# Patient Record
Sex: FEMALE | Race: WHITE | NOT HISPANIC OR LATINO | Employment: FULL TIME | URBAN - METROPOLITAN AREA
[De-identification: names, ages, dates, MRNs, and addresses within clinical notes are randomized per-mention and may not be internally consistent; named-entity substitution may affect disease eponyms.]

---

## 2019-02-21 ENCOUNTER — OFFICE VISIT - HEALTHEAST (OUTPATIENT)
Dept: INTERNAL MEDICINE | Facility: CLINIC | Age: 59
End: 2019-02-21

## 2019-02-21 DIAGNOSIS — Z12.4 SCREENING FOR MALIGNANT NEOPLASM OF CERVIX: ICD-10-CM

## 2019-02-21 DIAGNOSIS — Z12.11 SPECIAL SCREENING FOR MALIGNANT NEOPLASMS, COLON: ICD-10-CM

## 2019-02-21 DIAGNOSIS — Z12.31 VISIT FOR SCREENING MAMMOGRAM: ICD-10-CM

## 2019-02-21 DIAGNOSIS — Z13.220 LIPID SCREENING: ICD-10-CM

## 2019-02-21 DIAGNOSIS — Z01.818 PREOPERATIVE EXAMINATION: ICD-10-CM

## 2019-02-21 DIAGNOSIS — Z13.29 SCREENING FOR THYROID DISORDER: ICD-10-CM

## 2019-02-21 DIAGNOSIS — Z78.0 POSTMENOPAUSAL STATUS: ICD-10-CM

## 2019-02-21 DIAGNOSIS — M25.512 ACUTE PAIN OF LEFT SHOULDER: ICD-10-CM

## 2019-02-21 DIAGNOSIS — Z00.00 ANNUAL PHYSICAL EXAM: ICD-10-CM

## 2019-02-21 LAB
ALBUMIN SERPL-MCNC: 4.5 G/DL (ref 3.5–5)
ALP SERPL-CCNC: 81 U/L (ref 45–120)
ALT SERPL W P-5'-P-CCNC: 22 U/L (ref 0–45)
ANION GAP SERPL CALCULATED.3IONS-SCNC: 11 MMOL/L (ref 5–18)
AST SERPL W P-5'-P-CCNC: 16 U/L (ref 0–40)
ATRIAL RATE - MUSE: 72 BPM
BASOPHILS # BLD AUTO: 0 THOU/UL (ref 0–0.2)
BASOPHILS NFR BLD AUTO: 0 % (ref 0–2)
BILIRUB SERPL-MCNC: 0.7 MG/DL (ref 0–1)
BUN SERPL-MCNC: 13 MG/DL (ref 8–22)
CALCIUM SERPL-MCNC: 9.8 MG/DL (ref 8.5–10.5)
CHLORIDE BLD-SCNC: 105 MMOL/L (ref 98–107)
CHOLEST SERPL-MCNC: 273 MG/DL
CO2 SERPL-SCNC: 26 MMOL/L (ref 22–31)
CREAT SERPL-MCNC: 0.71 MG/DL (ref 0.6–1.1)
DIASTOLIC BLOOD PRESSURE - MUSE: NORMAL MMHG
EOSINOPHIL # BLD AUTO: 0.1 THOU/UL (ref 0–0.4)
EOSINOPHIL NFR BLD AUTO: 2 % (ref 0–6)
ERYTHROCYTE [DISTWIDTH] IN BLOOD BY AUTOMATED COUNT: 12.8 % (ref 11–14.5)
FASTING STATUS PATIENT QL REPORTED: ABNORMAL
GFR SERPL CREATININE-BSD FRML MDRD: >60 ML/MIN/1.73M2
GLUCOSE BLD-MCNC: 84 MG/DL (ref 70–125)
HCT VFR BLD AUTO: 45.3 % (ref 35–47)
HDLC SERPL-MCNC: 80 MG/DL
HGB BLD-MCNC: 14.6 G/DL (ref 12–16)
INTERPRETATION ECG - MUSE: NORMAL
LDLC SERPL CALC-MCNC: 176 MG/DL
LYMPHOCYTES # BLD AUTO: 1.3 THOU/UL (ref 0.8–4.4)
LYMPHOCYTES NFR BLD AUTO: 29 % (ref 20–40)
MCH RBC QN AUTO: 29.7 PG (ref 27–34)
MCHC RBC AUTO-ENTMCNC: 32.2 G/DL (ref 32–36)
MCV RBC AUTO: 92 FL (ref 80–100)
MONOCYTES # BLD AUTO: 0.4 THOU/UL (ref 0–0.9)
MONOCYTES NFR BLD AUTO: 10 % (ref 2–10)
NEUTROPHILS # BLD AUTO: 2.7 THOU/UL (ref 2–7.7)
NEUTROPHILS NFR BLD AUTO: 59 % (ref 50–70)
P AXIS - MUSE: 30 DEGREES
PLATELET # BLD AUTO: 265 THOU/UL (ref 140–440)
PMV BLD AUTO: 10.5 FL (ref 8.5–12.5)
POTASSIUM BLD-SCNC: 4.1 MMOL/L (ref 3.5–5)
PR INTERVAL - MUSE: 110 MS
PROT SERPL-MCNC: 7.3 G/DL (ref 6–8)
QRS DURATION - MUSE: 90 MS
QT - MUSE: 406 MS
QTC - MUSE: 444 MS
R AXIS - MUSE: 39 DEGREES
RBC # BLD AUTO: 4.91 MILL/UL (ref 3.8–5.4)
SODIUM SERPL-SCNC: 142 MMOL/L (ref 136–145)
SYSTOLIC BLOOD PRESSURE - MUSE: NORMAL MMHG
T AXIS - MUSE: 54 DEGREES
TRIGL SERPL-MCNC: 86 MG/DL
TSH SERPL DL<=0.005 MIU/L-ACNC: 1.73 UIU/ML (ref 0.3–5)
VENTRICULAR RATE- MUSE: 72 BPM
WBC: 4.6 THOU/UL (ref 4–11)

## 2019-02-21 ASSESSMENT — MIFFLIN-ST. JEOR: SCORE: 1363.33

## 2019-02-22 ENCOUNTER — RECORDS - HEALTHEAST (OUTPATIENT)
Dept: ADMINISTRATIVE | Facility: OTHER | Age: 59
End: 2019-02-22

## 2019-02-22 ENCOUNTER — COMMUNICATION - HEALTHEAST (OUTPATIENT)
Dept: INTERNAL MEDICINE | Facility: CLINIC | Age: 59
End: 2019-02-22

## 2019-02-25 LAB
BKR LAB AP ABNORMAL BLEEDING: NO
BKR LAB AP BIRTH CONTROL/HORMONES: NORMAL
BKR LAB AP CERVICAL APPEARANCE: NORMAL
BKR LAB AP GYN ADEQUACY: NORMAL
BKR LAB AP GYN INTERPRETATION: NORMAL
BKR LAB AP HPV REFLEX: NORMAL
BKR LAB AP LMP: 2017
BKR LAB AP PATIENT STATUS: NORMAL
BKR LAB AP PREVIOUS ABNORMAL: NO
BKR LAB AP PREVIOUS NORMAL: 2017
HIGH RISK?: NO
PATH REPORT.COMMENTS IMP SPEC: NORMAL
RESULT FLAG (HE HISTORICAL CONVERSION): NORMAL

## 2019-03-05 ENCOUNTER — RECORDS - HEALTHEAST (OUTPATIENT)
Dept: INTERNAL MEDICINE | Facility: CLINIC | Age: 59
End: 2019-03-05

## 2019-03-05 ENCOUNTER — COMMUNICATION - HEALTHEAST (OUTPATIENT)
Dept: INTERNAL MEDICINE | Facility: CLINIC | Age: 59
End: 2019-03-05

## 2019-04-04 ENCOUNTER — COMMUNICATION - HEALTHEAST (OUTPATIENT)
Dept: INTERNAL MEDICINE | Facility: CLINIC | Age: 59
End: 2019-04-04

## 2019-04-09 ENCOUNTER — RECORDS - HEALTHEAST (OUTPATIENT)
Dept: ADMINISTRATIVE | Facility: OTHER | Age: 59
End: 2019-04-09

## 2019-05-08 ENCOUNTER — COMMUNICATION - HEALTHEAST (OUTPATIENT)
Dept: INTERNAL MEDICINE | Facility: CLINIC | Age: 59
End: 2019-05-08

## 2021-06-02 VITALS — BODY MASS INDEX: 27.16 KG/M2 | HEIGHT: 66 IN | WEIGHT: 169 LBS

## 2021-06-18 NOTE — LETTER
Letter by Colette Cristina CNP at      Author: Colette Cristina CNP Service: -- Author Type: --    Filed:  Encounter Date: 3/5/2019 Status: (Other)       Santa Clarke  [address] [facility] [address]             March 5, 2019         Dear Ms. Clarke,    Below are the results from your recent visit:    Resulted Orders   Gynecologic Cytology (PAP Smear)   Result Value Ref Range    Case Report       Gynecologic Cytology Report                       Case: [id number]                                   Authorizing Provider:  Colette Cristina,    Collected:           02/21/2019 1156                                     CNP                                                                          Ordering Location:     [facility]         Received:            02/21/2019 1156                                     Medicine                                                                     First Screen:          Ludwig Valencia, CT                                                                           (ASCP)                                                                       Specimen:    SUREPATH PAP, SCREENING, Endocervical/cervical                                             Interpretation  Negative for squamous intraepithelial lesion or malignancy.      Negative for squamous intraepithelial lesion or malignancy    Result Flag Normal Normal    Specimen Adequacy       Satisfactory for evaluation, endocervical/transformation zone component present    HPV Reflex? Yes if ASCUS     HIGH RISK No     LMP/Menopause Date 2017     Abnormal Bleeding No     Pt Status n/a     Birth Control/Hormones None     Previous Normal/Date 2017     Prev Abn Date/Dx no     Cervical Appearance normal    Comprehensive Metabolic Panel   Result Value Ref Range    Sodium 142 136 - 145 mmol/L    Potassium 4.1 3.5 - 5.0 mmol/L    Chloride 105 98 - 107 mmol/L    CO2 26 22 - 31 mmol/L    Anion Gap, Calculation 11  5 - 18 mmol/L    Glucose 84 70 - 125 mg/dL    BUN 13 8 - 22 mg/dL    Creatinine 0.71 0.60 - 1.10 mg/dL    GFR MDRD Af Amer >60 >60 mL/min/1.73m2    GFR MDRD Non Af Amer >60 >60 mL/min/1.73m2    Bilirubin, Total 0.7 0.0 - 1.0 mg/dL    Calcium 9.8 8.5 - 10.5 mg/dL    Protein, Total 7.3 6.0 - 8.0 g/dL    Albumin 4.5 3.5 - 5.0 g/dL    Alkaline Phosphatase 81 45 - 120 U/L    AST 16 0 - 40 U/L    ALT 22 0 - 45 U/L    Narrative    Fasting Glucose reference range is 70-99 mg/dL per  American Diabetes Association (ADA) guidelines.   Lipid [place] RANDOM   Result Value Ref Range    Cholesterol 273 (H) <=199 mg/dL    Triglycerides 86 <=149 mg/dL    HDL Cholesterol 80 >=50 mg/dL    LDL Calculated 176 (H) <=129 mg/dL    Patient Fasting > 8hrs? Unknown    Thyroid Stimulating Hormone (TSH)   Result Value Ref Range    TSH 1.73 0.30 - 5.00 uIU/mL   HM1 (CBC with Diff)   Result Value Ref Range    WBC 4.6 4.0 - 11.0 thou/uL    RBC 4.91 3.80 - 5.40 mill/uL    Hemoglobin 14.6 12.0 - 16.0 g/dL    Hematocrit 45.3 35.0 - 47.0 %    MCV 92 80 - 100 fL    MCH 29.7 27.0 - 34.0 pg    MCHC 32.2 32.0 - 36.0 g/dL    RDW 12.8 11.0 - 14.5 %    Platelets 265 140 - 440 thou/uL    MPV 10.5 8.5 - 12.5 fL    Neutrophils % 59 50 - 70 %    Lymphocytes % 29 20 - 40 %    Monocytes % 10 2 - 10 %    Eosinophils % 2 0 - 6 %    Basophils % 0 0 - 2 %    Neutrophils Absolute 2.7 2.0 - 7.7 thou/uL    Lymphocytes Absolute 1.3 0.8 - 4.4 thou/uL    Monocytes Absolute 0.4 0.0 - 0.9 thou/uL    Eosinophils Absolute 0.1 0.0 - 0.4 thou/uL    Basophils Absolute 0.0 0.0 - 0.2 thou/uL        Please see your recent pap results which are negative. Recommend continued routine screening every  5 years.     Please call with questions or contact us using [employer].    Sincerely,        Electronically signed by Colette Cristina CNP

## 2021-06-18 NOTE — LETTER
Letter by Colette Cristina CNP at      Author: Colette Cristina CNP Service: -- Author Type: --    Filed:  Encounter Date: 2/22/2019 Status: (Other)       Parent/guardian of Santa Clarke  [facility] [address]             February 22, 2019         Dear Santa Clarke,    Below are the results from Santa's recent visit:    Resulted Orders   Comprehensive Metabolic Panel   Result Value Ref Range    Sodium 142 136 - 145 mmol/L    Potassium 4.1 3.5 - 5.0 mmol/L    Chloride 105 98 - 107 mmol/L    CO2 26 22 - 31 mmol/L    Anion Gap, Calculation 11 5 - 18 mmol/L    Glucose 84 70 - 125 mg/dL    BUN 13 8 - 22 mg/dL    Creatinine 0.71 0.60 - 1.10 mg/dL    GFR MDRD Af Amer >60 >60 mL/min/1.73m2    GFR MDRD Non Af Amer >60 >60 mL/min/1.73m2    Bilirubin, Total 0.7 0.0 - 1.0 mg/dL    Calcium 9.8 8.5 - 10.5 mg/dL    Protein, Total 7.3 6.0 - 8.0 g/dL    Albumin 4.5 3.5 - 5.0 g/dL    Alkaline Phosphatase 81 45 - 120 U/L    AST 16 0 - 40 U/L    ALT 22 0 - 45 U/L    Narrative    Fasting Glucose reference range is 70-99 mg/dL per  American Diabetes Association (ADA) guidelines.   Lipid [place] RANDOM   Result Value Ref Range    Cholesterol 273 (H) <=199 mg/dL    Triglycerides 86 <=149 mg/dL    HDL Cholesterol 80 >=50 mg/dL    LDL Calculated 176 (H) <=129 mg/dL    Patient Fasting > 8hrs? Unknown    Thyroid Stimulating Hormone (TSH)   Result Value Ref Range    TSH 1.73 0.30 - 5.00 uIU/mL   HM1 (CBC with Diff)   Result Value Ref Range    WBC 4.6 4.0 - 11.0 thou/uL    RBC 4.91 3.80 - 5.40 mill/uL    Hemoglobin 14.6 12.0 - 16.0 g/dL    Hematocrit 45.3 35.0 - 47.0 %    MCV 92 80 - 100 fL    MCH 29.7 27.0 - 34.0 pg    MCHC 32.2 32.0 - 36.0 g/dL    RDW 12.8 11.0 - 14.5 %    Platelets 265 140 - 440 thou/uL    MPV 10.5 8.5 - 12.5 fL    Neutrophils % 59 50 - 70 %    Lymphocytes % 29 20 - 40 %    Monocytes % 10 2 - 10 %    Eosinophils % 2 0 - 6 %    Basophils % 0 0 - 2 %    Neutrophils Absolute 2.7 2.0 - 7.7  thou/uL    Lymphocytes Absolute 1.3 0.8 - 4.4 thou/uL    Monocytes Absolute 0.4 0.0 - 0.9 thou/uL    Eosinophils Absolute 0.1 0.0 - 0.4 thou/uL    Basophils Absolute 0.0 0.0 - 0.2 thou/uL        Santa,  Please see your recent lab results.  Your cholesterol is mildly elevated.  I recommend following a  low cholesterol diet and regular exercise.  It was a pleasure meeting you.  Thank you for choosing  [facility] and for allowing me to be part of your healthcare team.     Please call with questions or contact us using [employer].    Sincerely,        Electronically signed by Colette Cristina, CNP

## 2021-06-19 NOTE — LETTER
Letter by Colette Cristina CNP at      Author: Colette Cristina CNP Service: -- Author Type: --    Filed:  Encounter Date: 5/8/2019 Status: (Other)               Santa Clarke  [address] [facility] [address]      [birth year]      Dear Ms. Clarke,      In reviewing your records, we have determined a gap in your preventative services.  Based on your age and health history, we recommend the following:      Mammogram      If you have had the service elsewhere, or have transferred your care to another clinic, please contact us so we can update our records.     Please call [phone] to schedule an appointment.    We believe that a strong preventative care program, including regular physicals and follow-up care is an important part of a healthy lifestyle and we are committed to helping you maintain your health.    Thank you for choosing us as your health care provider.    Sincerely,    [facility]

## 2021-06-19 NOTE — LETTER
Letter by Colette Cristina CNP at      Author: Colette Cristina CNP Service: -- Author Type: --    Filed:  Encounter Date: 4/4/2019 Status: (Other)               Santa Clarke  [address] [facility] [address]      [birth year]      Dear Santa Clarke,      In reviewing your records, we have determined a gap in your preventative services.  Based on your age and health history, we recommend the following:      Colon cancer screening    Mammogram      If you have had the service elsewhere, or have transferred your care to another clinic, please contact us so we can update our records.     Please call [phone] to schedule an appointment.    We believe that a strong preventative care program, including regular physicals and follow-up care is an important part of a healthy lifestyle and we are committed to helping you maintain your health.    Thank you for choosing us as your health care provider.    Sincerely,    [facility]

## 2021-06-24 NOTE — PROGRESS NOTES
Assessment/Plan:     1. Visit for screening mammogram  - Mammo Screening Bilateral; Future    2. Screening for malignant neoplasm of cervix  - Gynecologic Cytology (PAP Smear)    3. Acute pain of left shoulder  - Ambulatory referral to Orthopedics  Recommend utilization of over-the-counter pain reliever as needed  4. Special screening for malignant neoplasms, colon  Referral to colonoscopy has been completed  - Electrocardiogram Perform and Read  - Comprehensive Metabolic Panel  - HM1(CBC and Differential)  - HM1 (CBC with Diff)    5. Lipid screening  We will obtain a lipid panel for screening recommend 30 minutes of purposeful activity most days of the week low-cholesterol diet  - Lipid [place] RANDOM  - Ambulatory referral for Colonoscopy    6. Screening for thyroid disorder  - Thyroid Stimulating Hormone (TSH)    7. Preoperative examination  Colonoscopy referral completed    8. Annual physical exam  Follow-up one year      - Reviewed the importance of monitoring for changes in breast appearance such as nipple inversion, changes in breast tissue texture, non-healing wounds, or nipple discharge           Subjective:     Santa Clarke is a 58 y.o. female who presents for an annual exam.  Patient reports it has been nearly 2 years since her last.  She did undergo an ablation.  Though she reports having some symptoms of being postmenopausal including hot flashes, weight changes some sleeping difficulties.    Patient reports Left shoulder pain for the past 5 months.  She states that she has been doing some research on line and felt that the symptoms of frozen shoulder she subsequently has been doing at home exercises though has found no relief.  She reports some sleep disturbance secondary to the left shoulder pain she is requesting referral to orthopedics for possible injection.  Patient states she has tried over-the-counter pain reliever only when extremely needed does find mild relief not total relief of  symptoms.    Patient reports some more difficulty with controlling her bowel movements.  She has not undergone colonoscopy in the past.  She denies any family history of colon cancer or colon polyps.  She denies any constipation, diarrhea, blood in stool or black tarry stools.    Patient reports a hyperpigmented area on her right forearm she states it is been slightly larger without irritation.    The patient reports that there is not domestic violence in her life.     Healthy Habits:   Regular Exercise: Yes  Sunscreen Use: Yes  Healthy Diet: Yes  Dental Visits Regularly: Yes  Sexually active: Yes  Mammogram:   Colonoscopy: No  Prevention of Osteoporosis: Yes  Last Dexa: N/A    Immunization History   Administered Date(s) Administered     Tdap 12/07/2012     Immunization status: up to date and documented.    Gynecologic History  No LMP recorded (approximate).  Contraception: post menopausal status  Last Pap: 2017. Results were: normal HPV testing:   Last mammogram: 2017 Results were: normal    OB History   No data available        No current outpatient medications on file.     No current facility-administered medications for this visit.      No past medical history on file.  Past Surgical History:   Procedure Laterality Date     COMBINED AUGMENTATION MAMMAPLASTY AND ABDOMINOPLASTY  2011     SKIN GRAFT       Patient has no known allergies.  History reviewed. No pertinent family history.  Social History     Socioeconomic History     Marital status:      Spouse name: Not on file     Number of children: 2     Years of education: Not on file     Highest education level: Not on file   Occupational History     Occupation: realtor   Social Needs     Financial resource strain: Not on file     Food insecurity:     Worry: Not on file     Inability: Not on file     Transportation needs:     Medical: Not on file     Non-medical: Not on file   Tobacco Use     Smoking status: Never Smoker     Smokeless tobacco: Never Used  "  Substance and Sexual Activity     Alcohol use: Not on file     Drug use: Not on file     Sexual activity: Not on file   Lifestyle     Physical activity:     Days per week: Not on file     Minutes per session: Not on file     Stress: Not on file   Relationships     Social connections:     Talks on phone: Not on file     Gets together: Not on file     Attends Taoism service: Not on file     Active member of club or organization: Not on file     Attends meetings of clubs or organizations: Not on file     Relationship status: Not on file     Intimate partner violence:     Fear of current or ex partner: Not on file     Emotionally abused: Not on file     Physically abused: Not on file     Forced sexual activity: Not on file   Other Topics Concern     Not on file   Social History Narrative     Not on file       Review of Systems  General:  Negative except as noted above  Eyes: Negative except as noted above  Ears/Nose/Throat: Negative except as noted above  Cardiovascular: Negative except as noted above  Respiratory:  Negative except as noted above  Gastrointestinal:  Negative except as noted above  Musculoskeletal:  Negative except as noted above  Skin: Negative except as noted above  Neurologic: Negative except as noted above  Psychiatric: Negative except as noted above  Endocrine: Negative except as noted above  Heme/Lymphatic: Negative except as noted above   Allergic/Immunologic: Negative except as noted above      Objective:      Vitals:    02/21/19 1059   BP: 102/70   Pulse: 72   SpO2: 100%   Weight: 169 lb (76.7 kg)   Height: 5' 6\" (1.676 m)     Wt Readings from Last 3 Encounters:   02/21/19 169 lb (76.7 kg)     Body mass index is 27.28 kg/m . (>25?)    Physical Exam:  General Appearance: Alert, cooperative, no distress.  Head: Normocephalic, without obvious abnormality, atraumatic  Eyes: PERRL, conjunctiva/corneas clear, EOM's intact  Ears: Normal TM's and external ear canals, both ears  Nose: Nares normal, " septum midline,mucosa normal, no drainage  Throat: Lips, mucosa, and tongue normal  Neck: Supple, symmetrical, trachea midline, no adenopathy;  thyroid: not enlarged, symmetric, no tenderness/mass/nodules  Back: Symmetric, no curvature, ROM normal, no CVA tenderness  Lungs: Clear to auscultation bilaterally, respirations unlabored  Breasts: No breast masses, tenderness, asymmetry, or nipple discharge.  Implants noted  Heart: Regular rate and rhythm, S1 and S2 normal, no murmur, rub, or gallop  Abdomen: Soft, non-tender, bowel sounds active all four quadrants,  no masses, no organomegaly, patient noted to have a surgical scar post cosmetic surgery  Pelvic: Genital: EXTERNAL GENITALIA: Normal appearing vulva without masses, tenderness or lesions. PERINEUM: normal and intact. URETHRAL MEATUS: normal VAGINA:  vagina with normal color and without discharge or lesions. CERVIX: normal appearing cervix without discharge or lesions.  Pap obtained non-friable. No CMT. UTERUS: uterus is normal size, shape, consistency, and non-tender. ADNEXA: no tenderness or fullness.   Extremities: Extremities normal, atraumatic, no cyanosis or edema, she has right anterior shoulder pain increases with abduction and decreased range of motion is noted.  Skin: Skin color, texture, turgor normal, no rashes, she has a small hyperpigmented area on her right forearm she is advised to monitor if changes noted will recommend punch biopsy.  Lymph nodes: Cervical, supraclavicular, and axillary nodes normal  Neurologic: Normal  Psych: Normal affect.  Does not appear anxious or depressed.

## 2023-10-25 DIAGNOSIS — Q27.9 VENOUS MALFORMATION: Primary | ICD-10-CM

## 2023-11-01 ENCOUNTER — OFFICE VISIT (OUTPATIENT)
Dept: DERMATOLOGY | Facility: CLINIC | Age: 63
End: 2023-11-01
Attending: RADIOLOGY
Payer: COMMERCIAL

## 2023-11-01 ENCOUNTER — HOSPITAL ENCOUNTER (OUTPATIENT)
Dept: ULTRASOUND IMAGING | Facility: CLINIC | Age: 63
Discharge: HOME | End: 2023-11-01
Attending: RADIOLOGY
Payer: COMMERCIAL

## 2023-11-01 VITALS
HEART RATE: 71 BPM | SYSTOLIC BLOOD PRESSURE: 114 MMHG | HEIGHT: 65 IN | WEIGHT: 177.47 LBS | DIASTOLIC BLOOD PRESSURE: 74 MMHG | BODY MASS INDEX: 29.57 KG/M2

## 2023-11-01 DIAGNOSIS — Q27.9 VENOUS MALFORMATION: ICD-10-CM

## 2023-11-01 DIAGNOSIS — R22.41 MASS OF RIGHT FOOT: Primary | ICD-10-CM

## 2023-11-01 DIAGNOSIS — Q27.9 VASCULAR MALFORMATION: ICD-10-CM

## 2023-11-01 PROCEDURE — 76882 US LMTD JT/FCL EVL NVASC XTR: CPT | Mod: 26 | Performed by: RADIOLOGY

## 2023-11-01 PROCEDURE — 99214 OFFICE O/P EST MOD 30 MIN: CPT | Performed by: RADIOLOGY

## 2023-11-01 PROCEDURE — 76882 US LMTD JT/FCL EVL NVASC XTR: CPT | Mod: RT

## 2023-11-01 PROCEDURE — 99204 OFFICE O/P NEW MOD 45 MIN: CPT | Performed by: RADIOLOGY

## 2023-11-01 ASSESSMENT — PAIN SCALES - GENERAL: PAINLEVEL: NO PAIN (0)
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
## 2023-11-01 NOTE — NURSING NOTE
"[facility] [[id number]]  Chief Complaint   Patient presents with    Consult     VLC consult     Initial /74 (BP Location: Right arm, Patient Position: Sitting, Cuff Size: Adult Regular)   Pulse 71   Ht 5' 4.88\" (164.8 cm)   Wt 177 lb 7.5 oz (80.5 kg)   BMI 29.64 kg/m   Estimated body mass index is 29.64 kg/m  as calculated from the following:    Height as of this encounter: 5' 4.88\" (164.8 cm).    Weight as of this encounter: 177 lb 7.5 oz (80.5 kg).  Medication Reconciliation: complete    Does the patient need any medication refills today? No    Does the patient/parent need MyChart or Proxy acces today? Yes    Kaylee Eli LPN            "

## 2023-11-01 NOTE — PATIENT INSTRUCTIONS
VASCULAR ANOMALIES CLINIC, [facility]- 3rd Floor     Today you were seen in our Pediatric Vascular Lesions Clinic by one or several of the Physicians listed below:    Dr. Bernadette Charlton and Dr. Donny Collins, & Dr. Tashia Gaspar (Pediatric Dermatology) #[phone]  Dr. Joshua Hernández and Dr. Dennis Morales (Pediatric Surgeon) # [phone]  Dr. Theo Elder (Plastic and Reconstructive Surgery) # [phone]  Dr. Hubert Roberts (Pediatric Otolaryngology) # [phone]  Dr. Adilia Eaton & Dr. Montero (Pediatric Interventional Radiology) # [phone]  Dr. Kaylee Be and Kaylee Clark N.P. (Pediatric Hematologist-Oncology) # [phone]  Dr. Patrice Canada (Pediatric Ophthalmology) # [phone]   Dr. Narda Last (OBGYN) # [phone] or [phone] (urgent concerns)  Dr. Maldonado Deluna (Genetics) & Lilliana Borja (Genetic Counselor) # [phone]- for appointments & # [phone]-for nurse questions        Clinic phone numbers have been provided should you need to call to set up any appointments with one of the specific providers in the future.     You may have additional co-pays for provider consults who will be directly involved in your care.     If additional imaging is recommended, please call [phone] or [phone] to schedule these appointments.     Thank you for your participation in the [facility]'s Vascular Lesions Clinic!

## 2023-12-06 ENCOUNTER — HOSPITAL ENCOUNTER (OUTPATIENT)
Facility: CLINIC | Age: 63
End: 2023-12-06
Attending: RADIOLOGY | Admitting: RADIOLOGY
Payer: COMMERCIAL

## 2023-12-06 RX ORDER — CEFAZOLIN SODIUM 2 G/100ML
2 INJECTION, SOLUTION INTRAVENOUS
Status: COMPLETED | OUTPATIENT
Start: 2023-12-07 | End: 2023-12-07

## 2023-12-07 ENCOUNTER — HOSPITAL ENCOUNTER (OUTPATIENT)
Facility: CLINIC | Age: 63
Discharge: HOME | End: 2023-12-07
Attending: RADIOLOGY | Admitting: RADIOLOGY
Payer: COMMERCIAL

## 2023-12-07 ENCOUNTER — HOSPITAL ENCOUNTER (OUTPATIENT)
Dept: INTERVENTIONAL RADIOLOGY/VASCULAR | Facility: CLINIC | Age: 63
Discharge: HOME | End: 2023-12-07
Attending: RADIOLOGY | Admitting: RADIOLOGY
Payer: COMMERCIAL

## 2023-12-07 VITALS
OXYGEN SATURATION: 95 % | RESPIRATION RATE: 8 BRPM | DIASTOLIC BLOOD PRESSURE: 69 MMHG | HEART RATE: 71 BPM | SYSTOLIC BLOOD PRESSURE: 96 MMHG

## 2023-12-07 VITALS
HEART RATE: 70 BPM | OXYGEN SATURATION: 98 % | SYSTOLIC BLOOD PRESSURE: 116 MMHG | TEMPERATURE: 97.5 F | DIASTOLIC BLOOD PRESSURE: 74 MMHG

## 2023-12-07 DIAGNOSIS — Q27.9 VASCULAR MALFORMATION: ICD-10-CM

## 2023-12-07 DIAGNOSIS — R22.41 MASS OF RIGHT FOOT: ICD-10-CM

## 2023-12-07 LAB
ERYTHROCYTE [DISTWIDTH] IN BLOOD BY AUTOMATED COUNT: 12.7 % (ref 10–15)
HCT VFR BLD AUTO: 44.5 % (ref 35–47)
HGB BLD-MCNC: 14.3 G/DL (ref 11.7–15.7)
INR PPP: 0.91 (ref 0.85–1.15)
MCH RBC QN AUTO: 29.9 PG (ref 26.5–33)
MCHC RBC AUTO-ENTMCNC: 32.1 G/DL (ref 31.5–36.5)
MCV RBC AUTO: 93 FL (ref 78–100)
PLATELET # BLD AUTO: 233 10E3/UL (ref 150–450)
RBC # BLD AUTO: 4.78 10E6/UL (ref 3.8–5.2)
WBC # BLD AUTO: 5.2 10E3/UL (ref 4–11)

## 2023-12-07 PROCEDURE — 20206 BIOPSY MUSCLE PERQ NEEDLE: CPT | Performed by: RADIOLOGY

## 2023-12-07 PROCEDURE — 250N000011 HC RX IP 250 OP 636: Performed by: PHYSICIAN ASSISTANT

## 2023-12-07 PROCEDURE — 36415 COLL VENOUS BLD VENIPUNCTURE: CPT | Performed by: PHYSICIAN ASSISTANT

## 2023-12-07 PROCEDURE — 76942 ECHO GUIDE FOR BIOPSY: CPT | Mod: 26 | Performed by: RADIOLOGY

## 2023-12-07 PROCEDURE — 76942 ECHO GUIDE FOR BIOPSY: CPT | Mod: XS

## 2023-12-07 PROCEDURE — 250N000009 HC RX 250: Performed by: PHYSICIAN ASSISTANT

## 2023-12-07 PROCEDURE — 272N000505 HC NEEDLE CR5

## 2023-12-07 PROCEDURE — 99152 MOD SED SAME PHYS/QHP 5/>YRS: CPT | Performed by: RADIOLOGY

## 2023-12-07 PROCEDURE — 88307 TISSUE EXAM BY PATHOLOGIST: CPT | Mod: TC | Performed by: PHYSICIAN ASSISTANT

## 2023-12-07 PROCEDURE — 99152 MOD SED SAME PHYS/QHP 5/>YRS: CPT

## 2023-12-07 PROCEDURE — 37241 VASC EMBOLIZE/OCCLUDE VENOUS: CPT | Performed by: RADIOLOGY

## 2023-12-07 PROCEDURE — 250N000011 HC RX IP 250 OP 636: Mod: JZ | Performed by: PHYSICIAN ASSISTANT

## 2023-12-07 PROCEDURE — 36470 NJX SCLRSNT 1 INCMPTNT VEIN: CPT

## 2023-12-07 PROCEDURE — 710N000012 HC RECOVERY PHASE 2, PER MINUTE

## 2023-12-07 PROCEDURE — 88307 TISSUE EXAM BY PATHOLOGIST: CPT | Mod: 26 | Performed by: PATHOLOGY

## 2023-12-07 PROCEDURE — 99153 MOD SED SAME PHYS/QHP EA: CPT

## 2023-12-07 PROCEDURE — 85610 PROTHROMBIN TIME: CPT | Performed by: PHYSICIAN ASSISTANT

## 2023-12-07 PROCEDURE — 250N000009 HC RX 250: Performed by: RADIOLOGY

## 2023-12-07 PROCEDURE — 20206 BIOPSY MUSCLE PERQ NEEDLE: CPT

## 2023-12-07 PROCEDURE — 250N000011 HC RX IP 250 OP 636: Mod: JZ | Performed by: RADIOLOGY

## 2023-12-07 PROCEDURE — 85027 COMPLETE CBC AUTOMATED: CPT | Performed by: PHYSICIAN ASSISTANT

## 2023-12-07 RX ORDER — KETOROLAC TROMETHAMINE 30 MG/ML
30 INJECTION, SOLUTION INTRAMUSCULAR; INTRAVENOUS ONCE
Status: COMPLETED | OUTPATIENT
Start: 2023-12-07 | End: 2023-12-07

## 2023-12-07 RX ORDER — NALOXONE HYDROCHLORIDE 0.4 MG/ML
0.2 INJECTION, SOLUTION INTRAMUSCULAR; INTRAVENOUS; SUBCUTANEOUS
Status: DISCONTINUED | OUTPATIENT
Start: 2023-12-07 | End: 2023-12-08 | Stop reason: HOSPADM

## 2023-12-07 RX ORDER — FLUMAZENIL 0.1 MG/ML
0.2 INJECTION, SOLUTION INTRAVENOUS
Status: DISCONTINUED | OUTPATIENT
Start: 2023-12-07 | End: 2023-12-08 | Stop reason: HOSPADM

## 2023-12-07 RX ORDER — NALOXONE HYDROCHLORIDE 0.4 MG/ML
0.4 INJECTION, SOLUTION INTRAMUSCULAR; INTRAVENOUS; SUBCUTANEOUS
Status: DISCONTINUED | OUTPATIENT
Start: 2023-12-07 | End: 2023-12-08 | Stop reason: HOSPADM

## 2023-12-07 RX ORDER — SODIUM CHLORIDE 9 MG/ML
INJECTION, SOLUTION INTRAVENOUS CONTINUOUS
Status: DISCONTINUED | OUTPATIENT
Start: 2023-12-07 | End: 2023-12-08 | Stop reason: HOSPADM

## 2023-12-07 RX ORDER — IOPAMIDOL 612 MG/ML
1-15 INJECTION, SOLUTION INTRATHECAL ONCE
Status: COMPLETED | OUTPATIENT
Start: 2023-12-07 | End: 2023-12-07

## 2023-12-07 RX ORDER — FENTANYL CITRATE 50 UG/ML
25-50 INJECTION, SOLUTION INTRAMUSCULAR; INTRAVENOUS EVERY 5 MIN PRN
Status: DISCONTINUED | OUTPATIENT
Start: 2023-12-07 | End: 2023-12-08 | Stop reason: HOSPADM

## 2023-12-07 RX ORDER — LIDOCAINE 40 MG/G
CREAM TOPICAL
Status: DISCONTINUED | OUTPATIENT
Start: 2023-12-07 | End: 2023-12-08 | Stop reason: HOSPADM

## 2023-12-07 RX ORDER — SODIUM TETRADECYL SULFATE 30 MG/ML
5 INJECTION, SOLUTION INTRAVENOUS ONCE
Status: COMPLETED | OUTPATIENT
Start: 2023-12-07 | End: 2023-12-07

## 2023-12-07 RX ADMIN — LIDOCAINE HYDROCHLORIDE 2 ML: 10 INJECTION, SOLUTION EPIDURAL; INFILTRATION; INTRACAUDAL; PERINEURAL at 08:33

## 2023-12-07 RX ADMIN — FENTANYL CITRATE 25 MCG: 50 INJECTION INTRAMUSCULAR; INTRAVENOUS at 08:29

## 2023-12-07 RX ADMIN — FENTANYL CITRATE 50 MCG: 50 INJECTION INTRAMUSCULAR; INTRAVENOUS at 08:20

## 2023-12-07 RX ADMIN — FENTANYL CITRATE 25 MCG: 50 INJECTION INTRAMUSCULAR; INTRAVENOUS at 08:42

## 2023-12-07 RX ADMIN — MIDAZOLAM 0.5 MG: 1 INJECTION INTRAMUSCULAR; INTRAVENOUS at 08:30

## 2023-12-07 RX ADMIN — CEFAZOLIN SODIUM 2 G: 2 INJECTION, SOLUTION INTRAVENOUS at 07:24

## 2023-12-07 RX ADMIN — MIDAZOLAM 1 MG: 1 INJECTION INTRAMUSCULAR; INTRAVENOUS at 08:11

## 2023-12-07 RX ADMIN — MIDAZOLAM 0.5 MG: 1 INJECTION INTRAMUSCULAR; INTRAVENOUS at 08:42

## 2023-12-07 RX ADMIN — TETRADECYL HYDROGEN SULFATE (ESTER) 0.5 ML: 30 INJECTION, SOLUTION INTRAVENOUS at 08:32

## 2023-12-07 RX ADMIN — FENTANYL CITRATE 50 MCG: 50 INJECTION INTRAMUSCULAR; INTRAVENOUS at 08:10

## 2023-12-07 RX ADMIN — MIDAZOLAM 1 MG: 1 INJECTION INTRAMUSCULAR; INTRAVENOUS at 08:20

## 2023-12-07 RX ADMIN — IOPAMIDOL 1 ML: 612 INJECTION, SOLUTION INTRATHECAL at 08:33

## 2023-12-07 ASSESSMENT — ACTIVITIES OF DAILY LIVING (ADL)
ADLS_ACUITY_SCORE: 35
ADLS_ACUITY_SCORE: 35

## 2023-12-07 NOTE — PROCEDURES
[facility]    Procedure: IR Procedure Note    Date/Time: 12/7/2023 9:03 AM    Performed by: Adilia Eaton MD  Authorized by: Adilia Eaton MD      UNIVERSAL PROTOCOL   Site Marked: NA  Prior Images Obtained and Reviewed:  Yes  Required items: Required blood products, implants, devices and special equipment available    Patient identity confirmed:  Verbally with patient, arm band, provided demographic data and hospital-assigned identification number  Patient was reevaluated immediately before administering moderate or deep sedation or anesthesia  Confirmation Checklist:  Patient's identity using two indicators, relevant allergies, procedure was appropriate and matched the consent or emergent situation and correct equipment/implants were available  Time out: Immediately prior to the procedure a time out was called    Universal Protocol: the Joint Commission Universal Protocol was followed    Preparation: Patient was prepped and draped in usual sterile fashion       ANESTHESIA    Anesthesia:  Local infiltration  Local Anesthetic:  Lidocaine 1% without epinephrine      SEDATION  Patient Sedated: Yes    Sedation Type:  Moderate (conscious) sedation  Sedation:  Fentanyl and midazolam  Vital signs: Vital signs monitored during sedation    See dictated procedure note for full details.  Findings: See dictation    Specimens: none    Complications: None    Condition: Stable    Plan: Recovery per orders  Return to clinic in 4 weeks to discuss path results and assess clinical recovery      PROCEDURE  Describe Procedure: Possible superficial venous aneurysm treated with sotradecol  Biopsy of symptomatic area in right medial plantar mid foot  Patient Tolerance:  Patient tolerated the procedure well with no immediate complications  Length of time physician/provider present for 1:1 monitoring during sedation: 45

## 2023-12-07 NOTE — SEDATION DOCUMENTATION
Patient Name: Santa Clarke  Medical Record Number: [id number]  Today's Date: 12/7/2023    Procedure: right foot sclerotherapy and lesion biopsy  Proceduralist: Dr Eaton  Pathology present: NA    Procedure Start: 0810  Procedure end: 0850  Sedation medications administered: 150mcg fentanyl and 3 mg versed     Report given to: Adwoa MARTINS  : CHILANGO    Other Notes: Pt arrived to IR room 1 from home. Consent reviewed. Pt denies any questions or concerns regarding procedure. Pt positioned supine and monitored per protocol. Pt tolerated procedure without any noted complications. Pt transferred to PACU for recovery.

## 2023-12-08 ENCOUNTER — TELEPHONE (OUTPATIENT)
Dept: RADIOLOGY | Facility: CLINIC | Age: 63
End: 2023-12-08
Payer: COMMERCIAL

## 2023-12-08 NOTE — TELEPHONE ENCOUNTER
I spoke with Santa this morning. She is doing well after her procedure. No pain. She is trying to not put pressure on her treated foot. She will remove dressing today. I told her some bruising and mild swelling would me normal, but to let me know if there is any skin breakdown or redness. Pt will follow up with Dr. Eaton in 1 month. Pt agreed with POC.   Lizzy Parker MS,RN,CRN  Nurse Care Coordinator-Interventional Radiology  [phone]

## 2023-12-12 LAB
PATH REPORT.COMMENTS IMP SPEC: NORMAL
PATH REPORT.COMMENTS IMP SPEC: NORMAL
PATH REPORT.FINAL DX SPEC: NORMAL
PATH REPORT.GROSS SPEC: NORMAL
PATH REPORT.MICROSCOPIC SPEC OTHER STN: NORMAL
PATH REPORT.RELEVANT HX SPEC: NORMAL
PHOTO IMAGE: NORMAL

## 2023-12-23 ENCOUNTER — HEALTH MAINTENANCE LETTER (OUTPATIENT)
Age: 63
End: 2023-12-23

## 2024-01-10 ENCOUNTER — ONCOLOGY VISIT (OUTPATIENT)
Dept: RADIOLOGY | Facility: CLINIC | Age: 64
End: 2024-01-10
Attending: RADIOLOGY
Payer: COMMERCIAL

## 2024-01-10 VITALS
BODY MASS INDEX: 29.66 KG/M2 | RESPIRATION RATE: 12 BRPM | SYSTOLIC BLOOD PRESSURE: 145 MMHG | HEART RATE: 64 BPM | WEIGHT: 177.6 LBS | OXYGEN SATURATION: 96 % | TEMPERATURE: 97.4 F | DIASTOLIC BLOOD PRESSURE: 71 MMHG

## 2024-01-10 DIAGNOSIS — R52 PAIN: Primary | ICD-10-CM

## 2024-01-10 PROCEDURE — 99213 OFFICE O/P EST LOW 20 MIN: CPT | Performed by: RADIOLOGY

## 2024-01-10 RX ORDER — MULTIPLE VITAMINS W/ MINERALS TAB 9MG-400MCG
TAB ORAL EVERY 12 HOURS
COMMUNITY
Start: 2023-02-09

## 2024-01-10 ASSESSMENT — PAIN SCALES - GENERAL: PAINLEVEL: NO PAIN (0)

## 2024-01-10 NOTE — PROGRESS NOTES
INTERVENTIONAL RADIOLOGY ESTABLISHED PATIENT FOLLOW UP      HPI: Santa is a 63 year old female who follows up with us after intervention for diagnosis/treatment of painful lumps of right foot. On 12/07/23 we performed biopsy of the plantar lump, which resulted in tissue diagnosis of benign adipose tissue with myxoid changes, while the lump on hindfoot appeared to be consistent with low focus of abnormal dilated venous structure/aneurysm for which we performed sclerotherapy.     Today she reports she still continues to have pain upon weight bearing/walking. She notes the appearance of her dorsal blue lump treated with sclerotherapy is now near completely resolved. She reports she plan to walk less and instead exercise with a peloton bike instead.     ROS:  Negative unless otherwise stated in HPI.      Physical Examination:   CONSTITUTIONAL: healthy, alert and no distress.  PSYCHIATRIC: mentation appears normal and affect normal.  NEURO: Normal movements and speech.  EYES: No jaundice or pallor.  SKIN: No jaundice. Prominence/lump on dorsal aspect of foot and less prominent bump on plantar aspect of foot. Blue focal discoloration on doral aspect of foot has diminished.   RESP: No audible cough or wheeze.     VITALS:   There were no vitals taken for this visit.    Labs:    PATH RESULTS:  Final Diagnosis   Right foot, soft tissue, needle core biopsy:   - Benign dermal and subcutaneous connective tissue with mild myxoid change  - No definitive evidence of vascular malformation  - No evidence of neoplasm       Diagnostic studies:     Ultrasound and MRI personally reviewed and interpreted by me.  The MRI demonstrates subtle T2 hyperintensity in the subcutaneous tissues of the involved area without significant enhancement or focal vascularity.  A targeted ultrasound of the lesions only demonstrated some soft tissue thickening and heterogeneity, but no abnormal vascularity.     MR HIND FOOT RT W/WO CONT 3.14.23   1.  The  external markers placed in the patient's area of palpable masses along the dorsal and plantar surfaces of the foot correspond to areas of some mottled increased T2 signal but there is still normal-appearing T1 subcutaneous fat within these regions. They are most consistent with benign hemangiomas.   US LOWER EXTREMITY NON VASCULAR RIGHT, 11/1/2023      Findings:   Focused ultrasound of the right foot. Heterogenous soft tissue  thickening through the dorsal aspect of the foot, correlating with the  abnormality noted on MRI. There is a prominent superficial vein within  this area, but no additional venous structure or focal cyst is  appreciated. There is no observe flow outside of the vein on color  Doppler.                                                                      Impression: No vascular malformation of the right foot. There is a prominent superficial vein and thickened soft tissues, but no additional vascularity is appreciated at the point of interest.       Assessment 62 yo female with painful lumps of foot. Lump on dorsum of foot represented a dilated venous structure for which we performed sclerotherapy, which has successfully treated the lesion. Biopsy of plantar lesion demonstrated benign tissue with myxoid features without evidence for vascular lesion and may be traumatic related (likely minor repetitive trauma).     Plan: Given the above findings and no presence of vascular malformation, no further IR treatment is recommended. We would recommend her to continue to follow up with her podiatrist for management and possible guidance regarding footwear changes etc.    I was present for the entire clinic visit and agree with the assessment and plan documented by the resident/fellow.     t was a pleasure to conduct this clinic visit with Ms. Vince ospian.  Thank you for involving the interventional radiology service in her care.     I spent a total of 15 minutes face-to-face time on today's clinic  visit, over 50% time was for counseling and care coordination.  In addition I spent 5 minutes reviewing imaging.     Adilia Ly MD  Interventional Radiology           Pager [phone]        CC  Patient Care Team:  Colette Cristina NP as PCP - General (Orthothist)  ADILIA LY

## 2024-01-10 NOTE — LETTER
1/10/2024         RE: Santa Clarke  [address] [facility] [address]        Dear Colleague,    Thank you for referring your patient, Santa Clarke, to the [facility]. Please see a copy of my visit note below.        INTERVENTIONAL RADIOLOGY ESTABLISHED PATIENT FOLLOW UP      HPI: Santa is a 63 year old female who follows up with us after intervention for diagnosis/treatment of painful lumps of right foot. On 12/07/23 we performed biopsy of the plantar lump, which resulted in tissue diagnosis of benign adipose tissue with myxoid changes, while the lump on hindfoot appeared to be consistent with low focus of abnormal dilated venous structure/aneurysm for which we performed sclerotherapy.     Today she reports she still continues to have pain upon weight bearing/walking. She notes the appearance of her dorsal blue lump treated with sclerotherapy is now near completely resolved. She reports she plan to walk less and instead exercise with a peloton bike instead.     ROS:  Negative unless otherwise stated in HPI.      Physical Examination:   CONSTITUTIONAL: healthy, alert and no distress.  PSYCHIATRIC: mentation appears normal and affect normal.  NEURO: Normal movements and speech.  EYES: No jaundice or pallor.  SKIN: No jaundice. Prominence/lump on dorsal aspect of foot and less prominent bump on plantar aspect of foot. Blue focal discoloration on doral aspect of foot has diminished.   RESP: No audible cough or wheeze.     VITALS:   There were no vitals taken for this visit.    Labs:    PATH RESULTS:  Final Diagnosis   Right foot, soft tissue, needle core biopsy:   - Benign dermal and subcutaneous connective tissue with mild myxoid change  - No definitive evidence of vascular malformation  - No evidence of neoplasm       Diagnostic studies:     Ultrasound and MRI personally reviewed and interpreted by me.  The MRI demonstrates subtle T2 hyperintensity in the subcutaneous tissues  of the involved area without significant enhancement or focal vascularity.  A targeted ultrasound of the lesions only demonstrated some soft tissue thickening and heterogeneity, but no abnormal vascularity.     MR HIND FOOT RT W/WO CONT 3.14.23   1.  The external markers placed in the patient's area of palpable masses along the dorsal and plantar surfaces of the foot correspond to areas of some mottled increased T2 signal but there is still normal-appearing T1 subcutaneous fat within these regions. They are most consistent with benign hemangiomas.   US LOWER EXTREMITY NON VASCULAR RIGHT, 11/1/2023      Findings:   Focused ultrasound of the right foot. Heterogenous soft tissue  thickening through the dorsal aspect of the foot, correlating with the  abnormality noted on MRI. There is a prominent superficial vein within  this area, but no additional venous structure or focal cyst is  appreciated. There is no observe flow outside of the vein on color  Doppler.                                                                      Impression: No vascular malformation of the right foot. There is a prominent superficial vein and thickened soft tissues, but no additional vascularity is appreciated at the point of interest.       Assessment 62 yo female with painful lumps of foot. Lump on dorsum of foot represented a dilated venous structure for which we performed sclerotherapy, which has successfully treated the lesion. Biopsy of plantar lesion demonstrated benign tissue with myxoid features without evidence for vascular lesion and may be traumatic related (likely minor repetitive trauma).     Plan: Given the above findings and no presence of vascular malformation, no further IR treatment is recommended. We would recommend her to continue to follow up with her podiatrist for management and possible guidance regarding footwear changes etc.    I was present for the entire clinic visit and agree with the assessment and plan  documented by the resident/fellow.     t was a pleasure to conduct this clinic visit with Ms. Clarke  today.  Thank you for involving the interventional radiology service in her care.     I spent a total of 15 minutes face-to-face time on today's clinic visit, over 50% time was for counseling and care coordination.  In addition I spent 5 minutes reviewing imaging.     Adilia Ly MD  Interventional Radiology           Pager [phone]        CC  Patient Care Team:  Colette Cristina, NP as PCP - General (Orthothist)  ADILIA LY

## 2024-01-10 NOTE — NURSING NOTE
"Oncology Rooming Note    January 10, 2024 11:20 AM   Santa Clarke is a 63 year old female who presents for:    Chief Complaint   Patient presents with    Oncology Clinic Visit     Follow up sclerotherapy      Initial Vitals: BP (!) 145/71 (BP Location: Right arm, Patient Position: Sitting, Cuff Size: Adult Regular)   Pulse 64   Temp 97.4  F (36.3  C) (Oral)   Resp 12   Wt 80.6 kg (177 lb 9.6 oz)   SpO2 96%   BMI 29.66 kg/m   Estimated body mass index is 29.66 kg/m  as calculated from the following:    Height as of 11/1/23: 1.648 m (5' 4.88\").    Weight as of this encounter: 80.6 kg (177 lb 9.6 oz). Body surface area is 1.92 meters squared.  No Pain (0) Comment: Data Unavailable   No LMP recorded. Patient has had an ablation.  Allergies reviewed: Yes  Medications reviewed: Yes    Medications: Medication refills not needed today.  Pharmacy name entered into [employer]: [facility][id number] - [place] [id number] [address] AT [facility] [address]    Frailty Screening:   Is the patient here for a new oncology consult visit in cancer care? 2. No      Clinical concerns: none      Anastasia Armas              "

## 2025-01-12 ENCOUNTER — HEALTH MAINTENANCE LETTER (OUTPATIENT)
Age: 65
End: 2025-01-12

## 2025-05-17 ENCOUNTER — HEALTH MAINTENANCE LETTER (OUTPATIENT)
Age: 65
End: 2025-05-17

## (undated) RX ORDER — SODIUM TETRADECYL SULFATE 30 MG/ML
INJECTION, SOLUTION INTRAVENOUS
Status: DISPENSED
Start: 2023-12-07

## (undated) RX ORDER — FENTANYL CITRATE 50 UG/ML
INJECTION, SOLUTION INTRAMUSCULAR; INTRAVENOUS
Status: DISPENSED
Start: 2023-12-07

## (undated) RX ORDER — CEFAZOLIN SODIUM 2 G/100ML
INJECTION, SOLUTION INTRAVENOUS
Status: DISPENSED
Start: 2023-12-07

## (undated) RX ORDER — LIDOCAINE HYDROCHLORIDE 10 MG/ML
INJECTION, SOLUTION EPIDURAL; INFILTRATION; INTRACAUDAL; PERINEURAL
Status: DISPENSED
Start: 2023-12-07

## (undated) RX ORDER — HEPARIN SODIUM 200 [USP'U]/100ML
INJECTION, SOLUTION INTRAVENOUS
Status: DISPENSED
Start: 2023-12-07